# Patient Record
Sex: FEMALE | Race: WHITE | NOT HISPANIC OR LATINO | Employment: UNEMPLOYED | URBAN - METROPOLITAN AREA
[De-identification: names, ages, dates, MRNs, and addresses within clinical notes are randomized per-mention and may not be internally consistent; named-entity substitution may affect disease eponyms.]

---

## 2018-01-10 NOTE — MISCELLANEOUS
Dear Ulises Wharton : We missed you for your originally scheduled appointment for an EMG test ordered by Dr Calvin Jiang    Please call at your earliest convenience to reschedule this appointment      Sincerely,  Aster Paulino 89      Electronically signed by:Jacklyn Isidro   Jun 23 2016  9:35AM EST Co-author

## 2018-01-11 NOTE — PROCEDURES
Results/Data    Procedure: Nerve Conduction Study  Indication: Bilateral Upper Extremities   Referred by Dr Niki Grande  The procedure's were discussed with the patient  Written consent was obtained prior to the procedure and is detailed in the patient's record  Prior to the start of the procedure a time out was taken and the identity of the patient was confirmed via name and date of birth with the patient  The correct site and the procedure to be performed were confirmed  The correct side was confirmed if applicable  The positioning of the patient was verified  The availability of the correct equipment was verified  Procedure Start Time: 10:30   Procedure Completion Time: 11:00               The patient tolerated the procedure well  There were no complications  Results : Motor and sensory nerve conduction studies were performed on the bilateral median and ulnar nerves  The right median motor terminal latency was within normal limits with a normal compound motor action potential amplitude and a slow conduction velocity across the wrist  The left median motor terminal latency was within normal limits with a normal compound motor action potential amplitude and a slow conduction velocity across the wrist  The right ulnar motor terminal latency was within normal limits with a normal compound motor action potential amplitude and a slow conduction velocity distally and across the elbow  The left ulnar motor terminal latency was within normal limits with a normal compound motor action potential amplitude and a slow conduction velocity distally and across the elbow  The right and left median F wave latencies were within normal limits  The right and left ulnar F wave  latencies were prolonged  The right and left median sensory peak latency was prolonged with a normal sensory action potential amplitude  The right ulnar sensory action potential was within normal limits   The left ulnar sensory peak latency was prolonged with a normal sensory action potential amplitude  The right median and ulnar palmar evoked response was within normal limits  The left median palmar evoked response was prolonged by 0 6 ms as compared to the left ulnar palmar evoked response at the same distance  Interpretation: There is electrophysiologic evidence of a:    1  Bilateral moderate median nerve compression neuropathy at the wrist with demyelinative changes, consistent with a diagnosis of carpal tunnel syndrome  2 Bilateral mild ulnar neuropathy at the elbow with demyelinative changes   In addition, bilateral ulnar neuropathy at the wrist with demyelinative changes moderate in severity on the left and mild in severity on the right  3  A coexistent diffuse sensorimotor peripheral neuropathy secondary to the diabetes cannot be completely excluded  Correlation is recommended         Signatures   Electronically signed by : Javier Watkins MD; Sep  6 2016 11:22AM EST                       (Author)

## 2018-01-17 NOTE — MISCELLANEOUS
Dear Jimmy Kurtz: We missed you for your originally scheduled appointment for an EMG test ordered by Dr Walker Heal    Please call at your earliest convenience to reschedule this appointment      Sincerely,  Jey Yost 89      Electronically signed by:Jacklyn Isidro   Jul 15 2016  3:31PM EST Co-author

## 2018-09-25 ENCOUNTER — TELEPHONE (OUTPATIENT)
Dept: BARIATRICS | Facility: CLINIC | Age: 53
End: 2018-09-25